# Patient Record
Sex: FEMALE | Race: WHITE | URBAN - NONMETROPOLITAN AREA
[De-identification: names, ages, dates, MRNs, and addresses within clinical notes are randomized per-mention and may not be internally consistent; named-entity substitution may affect disease eponyms.]

---

## 2017-01-12 ENCOUNTER — AMBULATORY - GICH (OUTPATIENT)
Dept: RADIOLOGY | Facility: OTHER | Age: 77
End: 2017-01-12

## 2017-01-12 DIAGNOSIS — R92.8 OTHER ABNORMAL AND INCONCLUSIVE FINDINGS ON DIAGNOSTIC IMAGING OF BREAST: ICD-10-CM

## 2017-02-28 ENCOUNTER — AMBULATORY - GICH (OUTPATIENT)
Dept: SCHEDULING | Facility: OTHER | Age: 77
End: 2017-02-28

## 2017-06-19 ENCOUNTER — COMMUNICATION - GICH (OUTPATIENT)
Dept: FAMILY MEDICINE | Facility: OTHER | Age: 77
End: 2017-06-19

## 2017-06-19 DIAGNOSIS — M94.9 DISORDER OF CARTILAGE: ICD-10-CM

## 2017-06-19 DIAGNOSIS — M89.9 DISORDER OF BONE: ICD-10-CM

## 2017-07-25 ENCOUNTER — OFFICE VISIT - GICH (OUTPATIENT)
Dept: FAMILY MEDICINE | Facility: OTHER | Age: 77
End: 2017-07-25

## 2017-07-25 ENCOUNTER — HISTORY (OUTPATIENT)
Dept: FAMILY MEDICINE | Facility: OTHER | Age: 77
End: 2017-07-25

## 2017-07-25 DIAGNOSIS — M89.9 DISORDER OF BONE: ICD-10-CM

## 2017-07-25 DIAGNOSIS — I10 ESSENTIAL (PRIMARY) HYPERTENSION: ICD-10-CM

## 2017-07-25 DIAGNOSIS — R19.7 DIARRHEA: ICD-10-CM

## 2017-07-25 DIAGNOSIS — M94.9 DISORDER OF CARTILAGE: ICD-10-CM

## 2017-07-25 DIAGNOSIS — E78.5 HYPERLIPIDEMIA: ICD-10-CM

## 2017-07-25 LAB
ABSOLUTE BASOPHILS - HISTORICAL: 0 THOU/CU MM
ABSOLUTE EOSINOPHILS - HISTORICAL: 0.1 THOU/CU MM
ABSOLUTE IMMATURE GRANULOCYTES(METAS,MYELOS,PROS) - HISTORICAL: 0 THOU/CU MM
ABSOLUTE LYMPHOCYTES - HISTORICAL: 0.9 THOU/CU MM (ref 0.9–2.9)
ABSOLUTE MONOCYTES - HISTORICAL: 0.6 THOU/CU MM
ABSOLUTE NEUTROPHILS - HISTORICAL: 4.6 THOU/CU MM (ref 1.7–7)
BASOPHILS # BLD AUTO: 0.3 %
BILIRUB UR QL: NEGATIVE
CLARITY, URINE: CLEAR CLARITY
COLOR UR: YELLOW COLOR
EOSINOPHIL NFR BLD AUTO: 1.3 %
ERYTHROCYTE [DISTWIDTH] IN BLOOD BY AUTOMATED COUNT: 12.7 % (ref 11.5–15.5)
GLUCOSE URINE: NEGATIVE MG/DL
HCT VFR BLD AUTO: 39.2 % (ref 33–51)
HEMOGLOBIN: 13.9 G/DL (ref 12–16)
IMMATURE GRANULOCYTES(METAS,MYELOS,PROS) - HISTORICAL: 0.3 %
KETONES UR QL: NEGATIVE MG/DL
LEUKOCYTE ESTERASE URINE: NEGATIVE
LYMPHOCYTES NFR BLD AUTO: 14.7 % (ref 20–44)
MCH RBC QN AUTO: 35.4 PG (ref 26–34)
MCHC RBC AUTO-ENTMCNC: 35.5 G/DL (ref 32–36)
MCV RBC AUTO: 100 FL (ref 80–100)
MONOCYTES NFR BLD AUTO: 9.2 %
NEUTROPHILS NFR BLD AUTO: 74.2 % (ref 42–72)
NITRITE UR QL STRIP: NEGATIVE
OCCULT BLOOD,URINE - HISTORICAL: NEGATIVE
PH UR: 7 [PH]
PLATELET # BLD AUTO: 359 THOU/CU MM (ref 140–440)
PMV BLD: 9.2 FL (ref 6.5–11)
PROTEIN QUALITATIVE,URINE - HISTORICAL: NEGATIVE MG/DL
RED BLOOD COUNT - HISTORICAL: 3.93 MIL/CU MM (ref 4–5.2)
SP GR UR STRIP: 1.01
UROBILINOGEN,QUALITATIVE - HISTORICAL: NORMAL EU/DL
WHITE BLOOD COUNT - HISTORICAL: 6.2 THOU/CU MM (ref 4.5–11)

## 2017-07-28 ENCOUNTER — AMBULATORY - GICH (OUTPATIENT)
Dept: LAB | Facility: OTHER | Age: 77
End: 2017-07-28

## 2017-07-28 DIAGNOSIS — R19.7 DIARRHEA: ICD-10-CM

## 2017-07-28 LAB
CAMPYLOBACTER EIA - HISTORICAL: NEGATIVE
SHIGA TOXIN 1 - HISTORICAL: NEGATIVE
SHIGA TOXIN 2 - HISTORICAL: NEGATIVE

## 2017-07-31 LAB
METHOD O&P - HISTORICAL: NORMAL
OVA/PARASITE EXAM - HISTORICAL: NORMAL

## 2017-12-27 NOTE — PROGRESS NOTES
Patient Information     Patient Name MRN Sex Kanwal Churchill [id number] Female 1940      Progress Notes by Dre Chin MD at 2017 10:30 AM     Author:  Dre Chin MD Service:  (none) Author Type:  Physician     Filed:  2017 11:09 AM Encounter Date:  2017 Status:  Signed     :  Dre Chin MD (Physician)            Nursing Notes:   Elizabeth Navarrete  2017 10:54 AM  Signed  Patient presents to the clinic with diarrhea and abdominal pain x 1 month.  Elizabeth Navarrete LPN....................2017 10:35 AM    Kanwal Quinn is a 76 y.o. female who presents for   Chief Complaint     Patient presents with       Abdominal Pain      and diarrhea x 1 month     HPI: Ms. Quinn has noted loose stools for the past 5-6 weeks; this started [place]. The resort has its own water supply. She has not changed diet nor medications. There eis no blood but some mucous in twice daily stools with cramping. It is always better in the afternoon. We discuussed diet\kamla concerns  Past Medical History:     Diagnosis  Date     Preoperative examination 2009    Satisfactory preoperative H&P prior to right knee arthroplasty Dr. Freeman, scheduled 09      Screening for osteoporosis     DEXA ([facility]), osteopenia.      Screening for osteoporosis     DEXA, osteopenia both hips and LS spine, significant change from baseline       Smoking     Age 22 to 66, one pack per day.        Past Surgical History:      Procedure  Laterality Date     BREAST LUMPECTOMY      Right breast lumpectomy and lymph node dissection.       CHOLECYSTECTOMY       COLONOSCOPY  2012    Tubular adenoma and hyperplastic polyps - follow up 5 years       COLONOSCOPY SCREENING      mild sigmoid diverticulosis, no polyps, repeat .       COLONOSCOPY SCREENING  2011    hyperplastic sigmoid polyp; poor prep - follow up 1 year       TOTAL ABDOMINAL  "HYSTERECTOMY  1977    HOMA, polyps and endometriosi       Family History       Problem   Relation Age of Onset     Other  Father      atrial fibrillation, ulcer       Other  Mother      glioblastoma       Cancer-breast  Maternal Aunt      Cancer-colon  Brother      Current Outpatient Prescriptions       Medication  Sig Dispense Refill     alendronate (FOSAMAX) 70 mg tablet Take 1 tablet by mouth once a week in the morning. Take on empty stomach with full glass of water. Do not lie down for 1 hr. 12 tablet 0     aspirin 81 mg tablet Take 81 mg by mouth once daily with a meal.         atenolol (TENORMIN) 25 mg tablet Take 1 tablet by mouth once daily. 90 tablet 3     CALCIUM POLYCARBOPHIL (FIBERCON ORAL) Take 4 tablets by mouth once daily.       psyllium (FIBER-CAPS) 0.52 gram capsule Take 1 capsule by mouth once daily.  0     simvastatin (ZOCOR) 40 mg tablet Take 1 tablet by mouth at bedtime. 90 tablet 3     triamterene-hydrochlorothiazide, 37.5-25 mg, (MAXZIDE-25) 37.5-25 mg tablet Take 1 tablet by mouth once daily. 90 tablet 3     vitamin e 400 unit capsule Take 1 capsule by mouth once daily.  0     No current facility-administered medications for this visit.      Medications have been reviewed by me and are current to the best of my knowledge and ability.    No Known Allergies      EXAM:   Vitals:      07/25/17 1037 07/25/17 1039   BP: 148/84 140/76   Temp: 98.3  F (36.8  C)    TempSrc: Temporal    Weight: 65.5 kg (144 lb 6.4 oz)    Height: 1.68 m (5' 6.14\")      General Appearance: Pleasant, alert, appropriate appearance for age. No acute distress  Chest/Respiratory Exam: Normal chest wall and respirations. Clear to auscultation.  Cardiovascular Exam: Regular rate and rhythm. S1, S2, no murmur, click, gallop, or rubs.  Gastrointestinal Exam: Soft, nontender, no abnormal masses or organomegaly.  ASSESSMENT AND PLAN:  1. HYPERTENSION  controlled  - triamterene-hydrochlorothiazide, 37.5-25 mg, (MAXZIDE-25) 37.5-25 mg " tablet; Take 1 tablet by mouth once daily.  Dispense: 90 tablet; Refill: 3  - atenolol (TENORMIN) 25 mg tablet; Take 1 tablet by mouth once daily.  Dispense: 90 tablet; Refill: 3    2. Hyperlipidemia, unspecified hyperlipidemia type  Controlled does physical in [place] each year  - simvastatin (ZOCOR) 40 mg tablet; Take 1 tablet by mouth at bedtime.  Dispense: 90 tablet; Refill: 3    3. OSTEOPENIA    - alendronate (FOSAMAX) 70 mg tablet; Take 1 tablet by mouth once a week in the morning. Take on empty stomach with full glass of water. Do not lie down for 1 hr.  Dispense: 12 tablet; Refill: 3    4. Diarrhea, unspecified type  Will avoid milk and coffee and resort water at this time ; do labs and stools samples; plan to recheck if not improved over next 2 weeks

## 2017-12-28 NOTE — TELEPHONE ENCOUNTER
Patient Information     Patient Name MRN Kanwal Berg [id number] Female 1940      Telephone Encounter by Princess Gomez RN at 2017 11:07 AM     Author:  Princess Gomez RN Service:  (none) Author Type:  NURS- Registered Nurse     Filed:  2017 11:09 AM Encounter Date:  2017 Status:  Signed     :  Princess Gomez RN (NURS- Registered Nurse)            Osteoporosis    Office visit in the past 12 months or per provider note.    Last visit with DENISA NARAYANAN was on: 10/11/2016 in [facility]  Next visit with DENISA NARAYANAN is on: No future appointment listed with this provider  Next visit with Family Practice is on: No future appointment listed in this department    No limitation on refills for calcium and calcium with D.  Max refill for 12 months from last office visit or per provider note.    Prescription refilled per RN Medication Refill Policy.................... Princess Gomez RN ....................  2017   11:07 AM

## 2017-12-30 NOTE — NURSING NOTE
Patient Information     Patient Name MRN Kanwal Berg [id number] Female 1940      Nursing Note by Elizabeth Navarrete at 2017 10:30 AM     Author:  Elizabeth Navarrete Service:  (none) Author Type:  (none)     Filed:  2017 10:54 AM Encounter Date:  2017 Status:  Signed     :  Elizabeth Navarrete            Patient presents to the clinic with diarrhea and abdominal pain x 1 month.  Elizabeth Navarrete LPN....................2017 10:35 AM

## 2018-01-01 DIAGNOSIS — I10 BENIGN ESSENTIAL HYPERTENSION: Primary | ICD-10-CM

## 2018-01-01 RX ORDER — ATENOLOL 25 MG/1
TABLET ORAL
Qty: 90 TABLET | OUTPATIENT
Start: 2018-01-01

## 2018-01-01 RX ORDER — TRIAMTERENE/HYDROCHLOROTHIAZID 37.5-25 MG
TABLET ORAL
Qty: 90 TABLET | OUTPATIENT
Start: 2018-01-01

## 2018-01-26 VITALS
WEIGHT: 144.4 LBS | DIASTOLIC BLOOD PRESSURE: 76 MMHG | SYSTOLIC BLOOD PRESSURE: 140 MMHG | HEIGHT: 66 IN | TEMPERATURE: 98.3 F | BODY MASS INDEX: 23.21 KG/M2

## 2018-03-12 ENCOUNTER — DOCUMENTATION ONLY (OUTPATIENT)
Dept: FAMILY MEDICINE | Facility: OTHER | Age: 78
End: 2018-03-12

## 2018-03-12 PROBLEM — E78.5 HYPERLIPIDEMIA: Status: ACTIVE | Noted: 2018-03-12

## 2018-03-12 PROBLEM — N95.1 SYMPTOMATIC MENOPAUSAL OR FEMALE CLIMACTERIC STATES: Status: ACTIVE | Noted: 2018-03-12

## 2018-03-12 PROBLEM — M25.569 PAIN IN JOINT, LOWER LEG: Status: ACTIVE | Noted: 2018-03-12

## 2018-03-12 PROBLEM — I10 HYPERTENSION: Status: ACTIVE | Noted: 2018-03-12

## 2018-03-12 PROBLEM — D12.6 BENIGN NEOPLASM OF COLON: Status: ACTIVE | Noted: 2018-03-12

## 2018-03-12 PROBLEM — M89.9 DISORDER OF BONE AND CARTILAGE: Status: ACTIVE | Noted: 2018-03-12

## 2018-03-12 PROBLEM — K57.30 DIVERTICULAR DISEASE OF COLON: Status: ACTIVE | Noted: 2018-03-12

## 2018-03-12 PROBLEM — C50.919 MALIGNANT NEOPLASM OF FEMALE BREAST (H): Status: ACTIVE | Noted: 2018-03-12

## 2018-03-12 PROBLEM — M94.9 DISORDER OF BONE AND CARTILAGE: Status: ACTIVE | Noted: 2018-03-12

## 2018-03-12 PROBLEM — M19.90 OSTEOARTHROSIS: Status: ACTIVE | Noted: 2018-03-12

## 2018-03-12 PROBLEM — M54.50 LUMBAGO: Status: ACTIVE | Noted: 2018-03-12

## 2018-03-12 RX ORDER — SIMVASTATIN 40 MG
40 TABLET ORAL AT BEDTIME
COMMUNITY
Start: 2017-07-25

## 2018-03-12 RX ORDER — ATENOLOL 25 MG/1
25 TABLET ORAL DAILY
COMMUNITY
Start: 2017-07-25

## 2018-03-12 RX ORDER — TRIAMTERENE/HYDROCHLOROTHIAZID 37.5-25 MG
1 TABLET ORAL DAILY
COMMUNITY
Start: 2017-07-25

## 2018-03-12 RX ORDER — ALENDRONATE SODIUM 70 MG/1
70 TABLET ORAL WEEKLY
COMMUNITY
Start: 2017-07-25

## 2018-03-12 RX ORDER — VITAMIN E 268 MG
400 CAPSULE ORAL DAILY
COMMUNITY

## 2018-03-25 ENCOUNTER — HEALTH MAINTENANCE LETTER (OUTPATIENT)
Age: 78
End: 2018-03-25

## 2018-09-07 PROBLEM — I10 BENIGN ESSENTIAL HYPERTENSION: Status: ACTIVE | Noted: 2018-01-01

## 2018-09-07 PROBLEM — I10 HYPERTENSION: Status: RESOLVED | Noted: 2018-03-12 | Resolved: 2018-01-01

## 2018-09-07 NOTE — TELEPHONE ENCOUNTER
[facility] in  sent refill request for the folllowing:    ATENOLOL 25MG TABLETS  TAKE 1 TABLET BY MOUTH ONCE DAILY  Last Written Prescription Date:  7/25/17  Last Fill Quantity: 90,   # refills: 3  Last Office Visit: 7/25/17  Future Office visit:   None.    Beta-Blockers Protocol Failed9/7 9:32 AM   Blood pressure under 140/90 in past 12 months    Recent (12 mo) or future (30 days) visit within the authorizing provider's specialty           BP Readings from Last 3 Encounters:   07/25/17 140/76   10/11/16 130/72   10/16/15 118/82        Last OV 7/25/17 with PCP. Patient overdue for annual exam and in the absence of Dr. Chin was called to recommend they establish care with a new provider.     Per Patient: She is in [place] 7-8 months out of the year and is establishing care in [place]. This request was sent over automatically, but Patient has transferred to a [facility] in [place].     Refill request can be refused at this time. Unable to complete prescription refill per RN Medication Refill Policy. Bridgett Adamson RN .............. 9/7/2018  9:45 AM

## 2018-09-26 NOTE — TELEPHONE ENCOUNTER
[facility] sent Rx request for the following:  TRIAMTERENE 37.5MG/ HCTZ 25MG TABS  TAKE 1 TABLET BY MOUTH EVERY DAY  Last Written Prescription Date:  7/25/17  Last Fill Quantity: 90,   # refills: 3  Diuretics (Including Combos) Protocol Failed9/26 3:59 PM   Blood pressure under 140/90 in past 12 months    Recent (12 mo) or future (30 days) visit within the authorizing provider's specialty    Normal serum creatinine on file in past 12 months    Normal serum potassium on file in past 12 months    Normal serum sodium on file in past 12 months     Per refill encounter, dated 9/4/18:  Last OV 7/25/17 with PCP. Patient overdue for annual exam and in the absence of Dr. Chin was called to recommend they establish care with a new provider.      Per Patient: She is in [place] 7-8 months out of the year and is establishing care in [place]. This request was sent over automatically, but Patient has transferred to a [facility] in [place].     Refill request refused with note to pharmacy. Unable to complete prescription refill per RN Medication Refill Policy. Bridgett Adamson RN .............. 9/26/2018  4:07 PM

## 2019-01-01 ENCOUNTER — DOCUMENTATION ONLY (OUTPATIENT)
Dept: OTHER | Facility: CLINIC | Age: 79
End: 2019-01-01